# Patient Record
Sex: FEMALE | URBAN - METROPOLITAN AREA
[De-identification: names, ages, dates, MRNs, and addresses within clinical notes are randomized per-mention and may not be internally consistent; named-entity substitution may affect disease eponyms.]

---

## 2017-06-01 ENCOUNTER — HOSPITAL ENCOUNTER (OUTPATIENT)
Dept: INFUSION THERAPY | Age: 27
Discharge: HOME | End: 2017-06-01
Payer: COMMERCIAL

## 2017-06-01 VITALS
HEART RATE: 94 BPM | RESPIRATION RATE: 18 BRPM | OXYGEN SATURATION: 100 % | SYSTOLIC BLOOD PRESSURE: 109 MMHG | TEMPERATURE: 98.9 F | DIASTOLIC BLOOD PRESSURE: 70 MMHG

## 2017-06-01 PROCEDURE — 36415 COLL VENOUS BLD VENIPUNCTURE: CPT | Performed by: OBSTETRICS & GYNECOLOGY

## 2017-06-01 PROCEDURE — 96372 THER/PROPH/DIAG INJ SC/IM: CPT

## 2017-06-01 PROCEDURE — 86900 BLOOD TYPING SEROLOGIC ABO: CPT | Performed by: OBSTETRICS & GYNECOLOGY

## 2017-06-01 PROCEDURE — 74011250636 HC RX REV CODE- 250/636: Performed by: OBSTETRICS & GYNECOLOGY

## 2017-06-01 RX ORDER — RANITIDINE HCL 75 MG
75 TABLET ORAL 2 TIMES DAILY
COMMUNITY

## 2017-06-01 RX ORDER — LANOLIN ALCOHOL/MO/W.PET/CERES
CREAM (GRAM) TOPICAL
COMMUNITY

## 2017-06-01 RX ADMIN — HUMAN RHO(D) IMMUNE GLOBULIN 0.3 MG: 300 INJECTION, SOLUTION INTRAMUSCULAR at 15:34

## 2017-06-01 NOTE — PROGRESS NOTES
[facility] Progress Note    Date: 2297    Name: Tessa Wiley    MRN: [id number]         : 1990    Rhogam Injection      Ms. Victorino Woods to [facility], ambulatory at 1430. Pt was assessed and education was provided. Pt given written info about Rhogam; explained reason for giving to Rh negative mothers. Pt verbalized understanding and signed consent form. Ms. Rocky Torres vitals were reviewed and patient was observed for 5 minutes prior to treatment. Visit Vitals    /70 (BP 1 Location: Right arm, BP Patient Position: Sitting)    Pulse 94    Temp 98.9 °F (37.2 °C)    Resp 18    SpO2 100%    Breastfeeding No       Lab results were obtained and reviewed. Recent Results (from the past 12 hour(s))   TYPE & SCREEN    Collection Time: 17  1:19 PM   Result Value Ref Range    Crossmatch Expiration 06/15/2017     ABO/Rh(D) O NEGATIVE     Antibody screen NEG    RH IMMUNE GLOBULIN PROPHYLACTIC    Collection Time: 17  1:33 PM   Result Value Ref Range    No. of weeks gestation 29      Unit number [id number]     Blood component type RH IMMUNE GLOBULIN     Unit division 00     Status of unit ISSUED      Pt's blood type is O negative, which is within ordered parameters to give Rhogam today. Rhogam 300 mcg was administered IM in left deltoid. No irritation or bleeding noted at site. Bandaid applied. Ms. Victorino Woods tolerated well, and had no complaints. Patient refused to wait in the [facility] for 30 minute observation period. Pt given printed copy of discharge instructions; reviewed with her symptoms that require medical attention. Pt given Rhogam card with lot #, expiration date, etc.     Patient armband removed and shredded. Ms. Victorino Woods was discharged from [place] [address] in stable condition at [phone]. She is to follow-up with Dr. Sravanthi Mittal as instructed.     Ernesto Khan RN  2017

## 2017-06-02 LAB
ABO + RH BLD: NORMAL
BLD PROD TYP BPU: NORMAL
BLOOD GROUP ANTIBODIES SERPL: NORMAL
BPU ID: NORMAL
GA (WEEKS): 29 WK
SPECIMEN EXP DATE BLD: NORMAL
STATUS OF UNIT,%ST: NORMAL
UNIT DIVISION, %UDIV: 0